# Patient Record
Sex: FEMALE | Race: WHITE | Employment: OTHER | URBAN - METROPOLITAN AREA
[De-identification: names, ages, dates, MRNs, and addresses within clinical notes are randomized per-mention and may not be internally consistent; named-entity substitution may affect disease eponyms.]

---

## 2017-03-15 ENCOUNTER — HOSPITAL ENCOUNTER (OUTPATIENT)
Dept: PHYSICAL THERAPY | Age: 52
Discharge: HOME | End: 2017-03-15
Payer: COMMERCIAL

## 2017-03-15 PROCEDURE — 97110 THERAPEUTIC EXERCISES: CPT

## 2017-03-15 PROCEDURE — 97162 PT EVAL MOD COMPLEX 30 MIN: CPT

## 2017-03-15 NOTE — PROGRESS NOTES
[address] [facility] AT [address], [address] [place] [address] Road  Phone: [phone]   [phone]  PLAN OF CARE / [address] [place] [address] PHYSICAL THERAPY SERVICES  Patient Name: Felipe Rose : 1965   Medical   Diagnosis: Right ankle pain [M25.571] Treatment Diagnosis: M25.571   Onset Date: 16     Referral Source: Natanael Duval DPM Start of Care [facility]): 3/15/2017   Prior Hospitalization: See medical history Provider #: [id number]   Prior Level of Function: Independent w/ ADL's   Comorbidities: Tobacco use   Medications: Verified on Patient Summary List   The Plan of Care and following information is based on the information from the initial evaluation.   ===========================================================================================  Assessment / key information:  Patient is a 47 y/o female presenting with chief c/o R ankle pain and swelling. Patient had surgery on 16 for right ankle stabilization. 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 Pt reports her ankle swells and throbs during the day, difficulty climbing ladders. Pt has an ASO brace she has worn since prior to surgery. Pain intensity ranges from 2/10 up to 10/10. Patient presents with antalgic gait on right lower extremity (diminished push-off). Pt is moderately unsteady attempting single leg balance on involved leg compared to left side. Noted strength deficits include right ankle plantarflexion 4-/5, inversion/eversion 4/5, dorsiflexion 4+/5. Pt is TTP to the right ATF ligament, anterior subtalar joint line and posterior tibialis. The patient was instructed in a home exercise program to address the above findings/deficits.  The patient should benefit from therapy services to progress toward functional goals and improve quality of life.  ===========================================================================================  History MEDIUM  Complexity : 1-2 comorbidities / personal factors will impact the outcome/ POC ; Examination MEDIUM Complexity : 3 Standardized tests and measures addressing body structure, function, activity limitation and / or participation in recreation ; Presentation MEDIUM Complexity : Evolving with changing characteristics ; Decision Making MEDIUM Complexity : FOTO score of 26-74; Complexity MEDIUM  Problem List: pain affecting function, decrease ROM, decrease strength, edema affecting function, impaired gait/ balance and decrease ADL/ functional abilitiies   Treatment Plan may include any combination of the following: Therapeutic exercise, Therapeutic activities, Physical agent/modality, Gait/balance training, Manual therapy and Patient education  Patient / Family readiness to learn indicated by: asking questions, trying to perform skills and interest  Persons(s) to be included in education: patient (P)  Barriers to Learning/Limitations: None  Measures taken:    Patient Goal (s): Hopefully be pain free   Patient self reported health status: good  Rehabilitation Potential: good   Short Term Goals: To be accomplished in  4-6  treatments: Independent/compliant with long term HEP for ankle strength and ROM  Improve R ankle DF AROM by 5-10 degrees to improve gait mechanics  Able to perform single leg balance on involved side for 20-30 seconds on even surface   Long Term Goals:  To be accomplished in  8-12  treatments:  Improve FOTO outcome score by 11% to indicate a significant improvement in ADL function  Pt will be able to climb and descend 1 flight of stairs without deficit  Ambulate with non-antalgic gait on even surface without use of ASO brace  Frequency / Duration:   Patient to be seen  2  times per week for 4-6  weeks:  Patient / Caregiver education and instruction: brace/ splint application and exercises  G-Codes (GP): NA  Therapist Signature: Frannie Burciaga PT, OCS Date: 3/19/0187   Certification Period: NA Time: 10:42 AM   ===========================================================================================  I certify that the above Physical Therapy Services are being furnished while the patient is under my care. I agree with the treatment plan and certify that this therapy is necessary. Physician Signature:        Date:       Time:     Please sign and return to [facility] or you may fax the signed copy to [phone]. Thank you.

## 2017-03-15 NOTE — PROGRESS NOTES
PT EVALUATION/ DAILY TREATMENT NOTE    Patient Name: Edenilson Caputo  Date:3/15/2017  : 1965  [x]  Patient  Verified  Payor: Chris Vieyra / Plan: [place] [employer]  [facility] / Product Type: Commerical /    In time:10:00  Out time:10:42  Total Treatment Time (min): 42  Total Timed Codes (min): 42  1:1 Treatment Time ( only):    Visit #: 1       SUBJECTIVE  Patient is a 45 y/o female presenting with chief c/o R ankle pain and swelling. Patient had surgery on 16 for right ankle stabilization. 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 Pt reports her ankle swells and throbs during the day, difficulty climbing ladders. Pt has an ASO brace she has worn since prior to surgery. Pain intensity ranges from 2/10 up to 10/10.    Pain Level (0-10 scale): 2    Physical Therapy Evaluation  - Foot and Ankle    Gait: [] Normal    [] Abnormal    [x] Antalgic    [] NWB    Device:  Describe: Decreased stride pushing off right LE    ROM/Strength  [] Unable to assess at this time      AROM        PROM            Strength (1-5)   Left Right Left Right Left  Right   Dorsiflexion 10 0   5 4+   Plantarflexion 65 45   5 4-   Inversion 45 35   5 4   Eversion 10 7   5 4   Great Toe Ext         Great Toe Flex            Flexibility: Mod deficits of the R gastroc/soleus    Palpation:   Location: R ATF ligament, ant ST joint, posterior tibialis  Patient's Pain Response: [] Min   [x] Mod   [] Severe  Location:  Patient's Pain Response: [] Min   [] Mod   [] Severe    Optional Tests:  Balance/Stork Test: touches/60sec (L): (R):    Sub-talar alignment: [x] Neutral     [] Pronation      [] Supination    Forefoot alignment:  [x] Neutral     [] Varus            [] Valgus    Swelling:   Left (cm) Right (cm)   Figure 8:     Midfoot:      Malleoli Level:     MTH:        Anterior Drawer: [] Neg    [] Pos  Posterior Drawer:  [] Neg    [] Pos  Inversion Stress:  [] Neg    [] Pos  Talar Tilt:   [] Neg    [] Pos  Eversion Stress:  [] Neg    [] Pos  Cecilia's Sign:  [] Neg    [] Pos  Plata Test: [] Neg    [] Pos    Other tests/ comments:    OBJECTIVE TREATMENT:  Modality (rationale):   []  E-Stim: type _ x _ min     []att   []unatt   []w/US   []w/ice   []w/heat  []  Ultrasound: []cont   []pulse    _ W/cm2 x _  min   []1MHz   []3MHz  []  Iontophoresis: []take home patch w/ dexamethazone    []40mA   []80mA                               []_ mA min w/: []dexamethazone   []other:_  []  Ice pack _  min     [] Hot pack _  min     [] Paraffin _  min  []  Other:     Therapeutic Exercise: [x] see flow sheet     [] Other:_  Added/Changed Exercises:  [x]  Added:_See HEP  to improve (function): ankle ROM and strength  []  Changed:_ to improve (function):  (minutes:12 )    Manual Therapy:   (minutes: )    Gait Training: [] _ feet w/ _ device on level surface with _ level of assist  []  Other:_  (minutes: )    Other Objective/Functional Measures:   Pain Level (0-10 scale) post treatment: 2    ASSESSMENT  [x]  See Plan of Care    PLAN  [x] Other:See Plan of Marty Gonsalez 10, PT 3/15/2017  10:01 AM

## 2017-03-20 ENCOUNTER — HOSPITAL ENCOUNTER (OUTPATIENT)
Dept: PHYSICAL THERAPY | Age: 52
Discharge: HOME | End: 2017-03-20
Payer: COMMERCIAL

## 2017-03-20 PROCEDURE — 97110 THERAPEUTIC EXERCISES: CPT

## 2017-03-20 PROCEDURE — 97140 MANUAL THERAPY 1/> REGIONS: CPT

## 2017-03-20 NOTE — PROGRESS NOTES
PT DAILY TREATMENT NOTE     Patient Name: Adrienne Andrea  Date:3/20/2017  : 1965  [x]  Patient  Verified  Payor: Kimberlyn Elizondo / [place]: [facility] [employer] / Product Type: Commerical /    In time:10:30  Out time:11:21  Total Treatment Time (min): 51  Total Timed Codes (min): 45  1:1 Treatment Time (min):    Visit #: 2 of     Treatment Area: Right ankle pain [M25.571]    SUBJECTIVE  Pain Level (0-10 scale): 9/10  Any medication changes, allergies to medications, adverse drug reactions, diagnosis change, or new procedure performed?: [x] No    [] Yes (see summary sheet for update)  Subjective functional status/changes:   [] No changes reported  My ankle is hurting today from a combination of the weather and just coming here from work. OBJECTIVE      43 min Therapeutic Exercise:  [x] See flow sheet :   Rationale: increase ROM, increase strength, improve balance and increase proprioception to improve the patients ability to improve functional abilities    8 min Manual Therapy:  Subtalar distraction, DF A>P mobs and manual gastroc stretching   Rationale: increase ROM and increase tissue extensibility to improve functional mobility        min Patient Education: [x] Review HEP    [] Progressed/Changed HEP based on:   [] positioning   [] body mechanics   [] transfers   [] heat/ice application        Other Objective/Functional Measures:     Pain Level (0-10 scale) post treatment: 5/10    ASSESSMENT/Changes in Function: Pt tolerated all new therex well upon trial today with chief c/o anterolateral ankle pain/sxs with prolonged standing. Pt with the most challenge with eversion mobility and strengthening as well as limited dorsiflexion mobility with manual intervention today. Will continue to progress/advance patient within current POC as tolerated with monitoring symptoms.     Patient will continue to benefit from skilled PT services to modify and progress therapeutic interventions, address functional mobility deficits, address ROM deficits, address strength deficits, analyze and address soft tissue restrictions and analyze and cue movement patterns to attain remaining goals.      []  See Plan of Care  []  See progress note/recertification  []  See Discharge Summary         Progress towards goals / Updated goals:      PLAN  [x]  Upgrade activities as tolerated     []  Continue plan of care  []  Update interventions per flow sheet       []  Discharge due to:_  []  Other:_      Anju Palacios, DEEPAK 3/20/2017  10:28 AM

## 2017-03-24 ENCOUNTER — HOSPITAL ENCOUNTER (OUTPATIENT)
Dept: PHYSICAL THERAPY | Age: 52
Discharge: HOME | End: 2017-03-24
Payer: COMMERCIAL

## 2017-03-24 PROCEDURE — 97140 MANUAL THERAPY 1/> REGIONS: CPT

## 2017-03-24 PROCEDURE — 97110 THERAPEUTIC EXERCISES: CPT

## 2017-03-24 NOTE — PROGRESS NOTES
PT DAILY TREATMENT NOTE     Patient Name: Kerry Conroy  Date:3/24/2017  : 1965  [x]  Patient  Verified  Payor: Fallon Torres / Plan: [place] [employer]  [facility] Type: Commerical /    In time:10:35  Out time:11:38  Total Treatment Time (min): 63  Total Timed Codes (min): 54  1:1 Treatment Time (min):    Visit #: 3 of     Treatment Area: Right ankle pain [M25.571]    SUBJECTIVE  Pain Level (0-10 scale): 10  Any medication changes, allergies to medications, adverse drug reactions, diagnosis change, or new procedure performed?: [x] No    [] Yes (see summary sheet for update)  Subjective functional status/changes:   [] No changes reported  My ankle is feeling a little bit better than usual today, so I figured that I would try to go without the brace today if I could so that I don't become so dependent on it. OBJECTIVE      53 min Therapeutic Exercise:  [x] See flow sheet :   Rationale: increase ROM, increase strength, improve balance and increase proprioception to improve the patients ability to improve functional abilities     10 min Manual Therapy:  Subtalar distraction, DF A>P mobs and manual gastroc stretching and Mulligan kneeling DF belt mobilizations   Rationale: increase ROM and increase tissue extensibility to improve functional mobility           min Patient Education: [x] Review HEP    [] Progressed/Changed HEP based on:   [] positioning   [] body mechanics   [] transfers   [] heat/ice application        Other Objective/Functional Measures:     Pain Level (0-10 scale) post treatment: -2/10    ASSESSMENT/Changes in Function: Pt was able to advance to addition of SLS and step dorsiflexion stretches and also reported increased benefit with increased mobility with addition of Mulligan kneeling DF belt mobilizations with manual intervention today. Will continue to progress/advance patient within current POC as tolerated with monitoring symptoms.     Patient will continue to benefit from skilled PT services to modify and progress therapeutic interventions, address functional mobility deficits, address ROM deficits, address strength deficits, analyze and address soft tissue restrictions and analyze and cue movement patterns to attain remaining goals.      []  See Plan of Care  []  See progress note/recertification  []  See Discharge Summary         Progress towards goals / Updated goals:      PLAN  [x]  Upgrade activities as tolerated     []  Continue plan of care  []  Update interventions per flow sheet       []  Discharge due to:_  []  Other:_      Fox Nieves PTA 3/24/2017  10:39 AM

## 2017-03-27 ENCOUNTER — APPOINTMENT (OUTPATIENT)
Dept: PHYSICAL THERAPY | Age: 52
End: 2017-03-27
Payer: COMMERCIAL

## 2017-03-31 ENCOUNTER — HOSPITAL ENCOUNTER (OUTPATIENT)
Dept: PHYSICAL THERAPY | Age: 52
Discharge: HOME | End: 2017-03-31
Payer: COMMERCIAL

## 2017-03-31 PROCEDURE — 97140 MANUAL THERAPY 1/> REGIONS: CPT

## 2017-03-31 PROCEDURE — 97110 THERAPEUTIC EXERCISES: CPT

## 2017-03-31 NOTE — PROGRESS NOTES
PT DAILY TREATMENT NOTE     Patient Name: Javad Smith  Date:3/31/2017  : 1965  [x]  Patient  Verified  Payor: Todd Mcmillan / [facility] [place] [employer] /    In time:10:31  Out time:11:31  Total Treatment Time (min): 60  Total Timed Codes (min): 54  1:1 Treatment Time (min):    Visit #: 4 of     Treatment Area: Right ankle pain [M25.571]    SUBJECTIVE  Pain Level (0-10 scale): 0  Any medication changes, allergies to medications, adverse drug reactions, diagnosis change, or new procedure performed?: [x] No    [] Yes (see summary sheet for update)  Subjective functional status/changes:   [] No changes reported  I think that the one stretch that you did with the belt really helped after the last time that I was here because I didn't have as much pain in my ankle when I was walking for the rest of the day. OBJECTIVE         50 min Therapeutic Exercise:  [x] See flow sheet :   Rationale: increase ROM, increase strength, improve balance and increase proprioception to improve the patients ability to improve functional abilities    10 min Manual Therapy:  Subtalar distraction, DF A>P mobs and manual gastroc stretching and Mulligan kneeling DF belt mobilizations   Rationale: increase ROM and increase tissue extensibility to improve functional mobility           min Patient Education: [x] Review HEP    [] Progressed/Changed HEP based on:   [] positioning   [] body mechanics   [] transfers   [] heat/ice application        Other Objective/Functional Measures:     Pain Level (0-10 scale) post treatment: 0    ASSESSMENT/Changes in Function: Pt presents with most functional improvement with decreased pain in dorsal bridge of foot and restriction with active dorsiflexion with ambulation after initial trial with Mulligan belt DF mobs since last tx. Pt was also able to advance to addition of single leg forward leans as well as progression to standing BAPS and increased resistance with 4 way ankle theraband exercises with min to mod challenge today. Will continue to progress/advance patient within current POC as tolerated with monitoring symptoms. Patient will continue to benefit from skilled PT services to modify and progress therapeutic interventions, address functional mobility deficits, address ROM deficits, address strength deficits, analyze and address soft tissue restrictions and analyze and cue movement patterns to attain remaining goals.      []  See Plan of Care  []  See progress note/recertification  []  See Discharge Summary         Progress towards goals / Updated goals:    PLAN  [x]  Upgrade activities as tolerated     []  Continue plan of care  []  Update interventions per flow sheet       []  Discharge due to:_  []  Other:_      Nat Solomon, DEEPAK 3/31/2017  10:36 AM

## 2017-04-03 ENCOUNTER — APPOINTMENT (OUTPATIENT)
Dept: PHYSICAL THERAPY | Age: 52
End: 2017-04-03
Payer: COMMERCIAL

## 2017-04-07 ENCOUNTER — HOSPITAL ENCOUNTER (OUTPATIENT)
Dept: PHYSICAL THERAPY | Age: 52
Discharge: HOME | End: 2017-04-07
Payer: COMMERCIAL

## 2017-04-07 PROCEDURE — 97110 THERAPEUTIC EXERCISES: CPT

## 2017-04-07 PROCEDURE — 97140 MANUAL THERAPY 1/> REGIONS: CPT

## 2017-04-07 NOTE — PROGRESS NOTES
PT DAILY TREATMENT NOTE     Patient Name: Clementina Winters  Date:2017  : 1965  [x]  Patient  Verified  Payor: Rosaline López / Plan: [facility] PT / Product Type: Commerical /    In time:10:30  Out time:11:25  Total Treatment Time (min): 55  Total Timed Codes (min): 55  1:1 Treatment Time (min):    Visit #: 5 of     Treatment Area: Right ankle pain [M25.571]    SUBJECTIVE  Pain Level (0-10 scale): 0  Any medication changes, allergies to medications, adverse drug reactions, diagnosis change, or new procedure performed?: [x] No    [] Yes (see summary sheet for update)  Subjective functional status/changes:   [] No changes reported  Pt reports she is having less pain in general and feels she is walking better. She still has pain/difficulty with ladders at work. OBJECTIVE    45 min Therapeutic Exercise:  [] See flow sheet :   Rationale: increase strength, improve balance and increase proprioception to improve the patients ability to stand, perform dynamic lower extremity activities    10 min Manual Therapy:  Gr 3-4 subtalar distraction and DF mobilization, distal tib/fib mobilization   Rationale: increase tissue extensibility to improve gait mechanics    Pain Level (0-10 scale) post treatment: 0    ASSESSMENT/Changes in Function: Pt demonstrates significant gait improvements since evaluation, with no deficits noted today on even surface. Needs some additional work on dorsiflexion mobility. Patient will continue to benefit from skilled PT services to modify and progress therapeutic interventions, address strength deficits and analyze and cue movement patterns to attain remaining goals.      []  See Plan of Care  []  See progress note/recertification  []  See Discharge Summary         Progress towards goals / Updated goals:  GOAL: Able to perform single leg balance on involved side for 20-30 seconds on even surface- Met    PLAN  []  Upgrade activities as tolerated     [x]  Continue plan of care  []  Update interventions per flow sheet       []  Discharge due to:_  []  Other:_      Titi Calvert, PT 4/7/2017  8:06 AM

## 2017-04-10 ENCOUNTER — HOSPITAL ENCOUNTER (OUTPATIENT)
Dept: PHYSICAL THERAPY | Age: 52
Discharge: HOME | End: 2017-04-10
Payer: COMMERCIAL

## 2017-04-10 PROCEDURE — 97140 MANUAL THERAPY 1/> REGIONS: CPT

## 2017-04-10 PROCEDURE — 97110 THERAPEUTIC EXERCISES: CPT

## 2017-04-10 NOTE — PROGRESS NOTES
PT DAILY TREATMENT NOTE     Patient Name: Mayra Meléndez  Date:4/10/2017  : 1965  [x]  Patient  Verified  Payor: Cornelius Bowser / Plan[facility] [place] [employer]  [facility] Product Type: Commerical /    In time:1028  Out time:1115  Total Treatment Time (min): 47  Visit #: 6 of     Treatment Area: Right ankle pain [M25.571]    SUBJECTIVE  Pain Level (0-10 scale): 2-3  Any medication changes, allergies to medications, adverse drug reactions, diagnosis change, or new procedure performed?: [x] No    [] Yes (see summary sheet for update)  Subjective functional status/changes:   [] No changes reported  \"I feel like it comes and goes\"    OBJECTIVE    37 min Therapeutic Exercise:  [] See flow sheet :   Rationale: increase strength, improve balance and increase proprioception to improve the patients ability to stand, perform dynamic lower extremity activities    10 min Manual Therapy:  Gr 3-4 subtalar distraction and DF mobilization, distal tib/fib mobilization   Rationale: increase tissue extensibility to improve gait mechanics    Pain Level (0-10 scale) post treatment: 0    ASSESSMENT/Changes in Function: Pt continues to have moderate DF limitations with walking. Pt was able to progress to L2 on the baps and will continue to progress with balance. Pt also continues to have progress towards start taps and will add those NV. Will continue to progress therex within current POC as patient is able. Patient will continue to benefit from skilled PT services to modify and progress therapeutic interventions, address strength deficits and analyze and cue movement patterns to attain remaining goals.      []  See Plan of Care  []  See progress note/recertification  []  See Discharge Summary         Progress towards goals / Updated goals:      PLAN  []  Upgrade activities as tolerated     [x]  Continue plan of care  []  Update interventions per flow sheet       []  Discharge due to:_  []  Other:_      Jed Alicia PT 4/10/2017  8:06 AM

## 2017-04-14 ENCOUNTER — HOSPITAL ENCOUNTER (OUTPATIENT)
Dept: PHYSICAL THERAPY | Age: 52
Discharge: HOME | End: 2017-04-14
Payer: COMMERCIAL

## 2017-04-14 PROCEDURE — 97110 THERAPEUTIC EXERCISES: CPT

## 2017-04-14 PROCEDURE — 97140 MANUAL THERAPY 1/> REGIONS: CPT

## 2017-04-14 NOTE — PROGRESS NOTES
PT DAILY TREATMENT NOTE     Patient Name: Javad Smith  Date:2017  : 1965  [x]  Patient  Verified  Payor: Todd Mcmillan / [facility] [place] [employer]  [facility] Product Type: Commer[facility] /    In time:1032  Out time:1130  Total Treatment Time (min): 58  Visit #: 7 of     Treatment Area: Right ankle pain [M25.571]    SUBJECTIVE  Pain Level (0-10 scale):3  Any medication changes, allergies to medications, adverse drug reactions, diagnosis change, or new procedure performed?: [x] No    [] Yes (see summary sheet for update)  Subjective functional status/changes:   [] No changes reported  SEE PN    OBJECTIVE    48 min Therapeutic Exercise:  [] See flow sheet :   Rationale: increase strength, improve balance and increase proprioception to improve the patients ability to stand, perform dynamic lower extremity activities    10 min Manual Therapy:  Gr 3-4 subtalar distraction and DF mobilization, distal tib/fib mobilization   Rationale: increase tissue extensibility to improve gait mechanics    Pain Level (0-10 scale) post treatment: 0    ASSESSMENT/Changes in Function: SEE PN    Patient will continue to benefit from skilled PT services to modify and progress therapeutic interventions, address strength deficits and analyze and cue movement patterns to attain remaining goals.      []  See Plan of Care  [x]  See progress note/recertification  []  See Discharge Summary         Progress towards goals / Updated goals:      PLAN  []  Upgrade activities as tolerated     [x]  Continue plan of care  []  Update interventions per flow sheet       []  Discharge due to:_  []  Other:_      Elina Solorzano, PT 2017  8:06 AM

## 2017-04-14 NOTE — PROGRESS NOTES
[facility] AT [place] [address], [place], [address]  Phone: [phone]   [phone]  PROGRESS NOTE  Patient Name: Eb Mobley : 1965   Treatment/Medical Diagnosis: Right ankle pain [M25.571]   Referral Source: Walker Fox DPM     Date of Initial Visit: 3/15/17 Attended Visits: 7 Missed Visits: 1 CXL     SUMMARY OF TREATMENT  Pt was seen on 3/15/17 for an initial evaluation for R ankle pain and weakness. Pt has been seen for 7 visits and therapy has included: therapeutic exercise, manual therapy, modalities for pain control, patient education and HEP. CURRENT STATUS  Pt continues to progress in therapy with decreased pain in the R ankle, improved AROM, as well as improved ankle stability. Pt continues to be limited mostly with DF and pain in the R lateral aspect of the ankle. Pt has tolerate therex well with progression towards SL standing and balancing. Pt is able to stand SL on foam with minimal UE support at this time. Pt would benefit from continuation of skilled PT 5 remaining visits left on POC in order to address the remaining deficits and goals. R ankle AROM: 3 DF, 50 PF, 35INV, 10 EV    Short Term Goals: To be accomplished in 4-6 treatments: Independent/compliant with long term HEP for ankle strength and ROM- goal met  Improve R ankle DF AROM by 5-10 degrees to improve gait mechanics- goal progressing  Able to perform single leg balance on involved side for 20-30 seconds on even surface- goal met  · Long Term Goals:  To be accomplished in 8-12 treatments:  Improve FOTO outcome score by 11% to indicate a significant improvement in ADL function- goal progressing  Pt will be able to climb and descend 1 flight of stairs without deficit- goal met  Ambulate with non-antalgic gait on even surface without use of ASO brace- goal progressing    RECOMMENDATIONS  Continue with skilled PT 5 remaining visits from original POC then reassess for continuation or DC. If you have any questions/comments please contact us directly at [phone]. Thank you for allowing us to assist in the care of your patient. Therapist Signature: Satnam Solorzano PT Date: 4/14/2017     Time: 10:06 AM   NOTE TO PHYSICIAN:  PLEASE COMPLETE THE ORDERS BELOW AND FAX TO   [facility] UNIT: [phone]. If you are unable to process this request in 24 hours please contact our office: [phone].    ___ I have read the above report and request that my patient continue as recommended.   ___ I have read the above report and request that my patient continue therapy with the following changes/special instructions:_________________________________________________________   ___ I have read the above report and request that my patient be discharged from therapy.      Physician Signature:        Date:       Time:

## 2017-04-17 ENCOUNTER — APPOINTMENT (OUTPATIENT)
Dept: PHYSICAL THERAPY | Age: 52
End: 2017-04-17
Payer: COMMERCIAL

## 2017-04-21 ENCOUNTER — APPOINTMENT (OUTPATIENT)
Dept: PHYSICAL THERAPY | Age: 52
End: 2017-04-21
Payer: COMMERCIAL

## 2017-04-24 ENCOUNTER — APPOINTMENT (OUTPATIENT)
Dept: PHYSICAL THERAPY | Age: 52
End: 2017-04-24
Payer: COMMERCIAL

## 2017-04-28 ENCOUNTER — APPOINTMENT (OUTPATIENT)
Dept: PHYSICAL THERAPY | Age: 52
End: 2017-04-28
Payer: COMMERCIAL

## 2017-05-01 ENCOUNTER — APPOINTMENT (OUTPATIENT)
Dept: PHYSICAL THERAPY | Age: 52
End: 2017-05-01

## 2017-05-05 ENCOUNTER — APPOINTMENT (OUTPATIENT)
Dept: PHYSICAL THERAPY | Age: 52
End: 2017-05-05

## 2017-05-08 ENCOUNTER — APPOINTMENT (OUTPATIENT)
Dept: PHYSICAL THERAPY | Age: 52
End: 2017-05-08

## 2017-05-12 ENCOUNTER — APPOINTMENT (OUTPATIENT)
Dept: PHYSICAL THERAPY | Age: 52
End: 2017-05-12

## 2017-06-05 NOTE — PROGRESS NOTES
[facility] AT [place] [address], [place], [address] [place] [address] Road  Phone: [phone]   [phone]  DISCHARGE SUMMARY  Patient Name: Manuel Wang : 1965   Treatment/Medical Diagnosis: Right ankle pain [M25.571]   Referral Source: Lakesha Zamorano DPM     Date of Initial Visit: 3/15/17 Attended Visits: 7 Missed Visits: 1     SUMMARY OF TREATMENT  Pt was seen on 3/15/17 for an initial evaluation for R ankle pain and weakness. Pt has been seen for 7 visits and therapy has included: therapeutic exercise, manual therapy, modalities for pain control, patient education and HEP. CURRENT STATUS  Patient has not been seen in therapy since their visit on 17. Patient called to request to cancel all remaining visits and be DC from therapy. Patient status as of last PN is as follows: Pt continues to progress in therapy with decreased pain in the R ankle, improved AROM, as well as improved ankle stability. Pt continues to be limited mostly with DF and pain in the R lateral aspect of the ankle. Pt has tolerate therex well with progression towards SL standing and balancing. Pt is able to stand SL on foam with minimal UE support at this time    R ankle AROM: 3 DF, 50 PF, 35INV, 10 EV     Short Term Goals: To be accomplished in 4-6 treatments: Independent/compliant with long term HEP for ankle strength and ROM- goal met  Improve R ankle DF AROM by 5-10 degrees to improve gait mechanics- goal progressing  Able to perform single leg balance on involved side for 20-30 seconds on even surface- goal met  · Long Term Goals:  To be accomplished in 8-12 treatments:  Improve FOTO outcome score by 11% to indicate a significant improvement in ADL function- goal progressing  Pt will be able to climb and descend 1 flight of stairs without deficit- goal met  Ambulate with non-antalgic gait on even surface without use of ASO brace- goal progressing    RECOMMENDATIONS  Other: Patient requests to cancel all her remaining appointments and be DC from therapy. If you have any questions/comments please contact us directly at [phone]. Thank you for allowing us to assist in the care of your patient.     Therapist Signature: Bhaskar Cornell PT Date: 6/5/2017   Reporting Period: NA Time: 1:28 PM